# Patient Record
Sex: MALE | Race: WHITE
[De-identification: names, ages, dates, MRNs, and addresses within clinical notes are randomized per-mention and may not be internally consistent; named-entity substitution may affect disease eponyms.]

---

## 2017-07-12 ENCOUNTER — HOSPITAL ENCOUNTER (OUTPATIENT)
Dept: HOSPITAL 47 - EC | Age: 62
Setting detail: OBSERVATION
LOS: 1 days | Discharge: HOME | End: 2017-07-13
Payer: COMMERCIAL

## 2017-07-12 VITALS — BODY MASS INDEX: 29.7 KG/M2

## 2017-07-12 DIAGNOSIS — Z99.89: ICD-10-CM

## 2017-07-12 DIAGNOSIS — Z79.82: ICD-10-CM

## 2017-07-12 DIAGNOSIS — R07.89: Primary | ICD-10-CM

## 2017-07-12 DIAGNOSIS — Z79.51: ICD-10-CM

## 2017-07-12 DIAGNOSIS — Z79.899: ICD-10-CM

## 2017-07-12 DIAGNOSIS — K30: ICD-10-CM

## 2017-07-12 DIAGNOSIS — I10: ICD-10-CM

## 2017-07-12 DIAGNOSIS — G47.30: ICD-10-CM

## 2017-07-12 DIAGNOSIS — Z88.3: ICD-10-CM

## 2017-07-12 DIAGNOSIS — Z91.048: ICD-10-CM

## 2017-07-12 LAB
ALP SERPL-CCNC: 65 U/L (ref 38–126)
ALT SERPL-CCNC: 50 U/L (ref 21–72)
ANION GAP SERPL CALC-SCNC: 11 MMOL/L
AST SERPL-CCNC: 30 U/L (ref 17–59)
BASOPHILS # BLD AUTO: 0 K/UL (ref 0–0.2)
BASOPHILS NFR BLD AUTO: 1 %
BUN SERPL-SCNC: 15 MG/DL (ref 9–20)
CALCIUM SPEC-MCNC: 9.6 MG/DL (ref 8.4–10.2)
CH: 30.2
CHCM: 34.6
CHLORIDE SERPL-SCNC: 106 MMOL/L (ref 98–107)
CK SERPL-CCNC: 143 U/L (ref 55–170)
CO2 SERPL-SCNC: 25 MMOL/L (ref 22–30)
EOSINOPHIL # BLD AUTO: 0.2 K/UL (ref 0–0.7)
EOSINOPHIL NFR BLD AUTO: 2 %
ERYTHROCYTE [DISTWIDTH] IN BLOOD BY AUTOMATED COUNT: 4.65 M/UL (ref 4.3–5.9)
ERYTHROCYTE [DISTWIDTH] IN BLOOD: 13.6 % (ref 11.5–15.5)
GLUCOSE SERPL-MCNC: 124 MG/DL (ref 74–99)
HCT VFR BLD AUTO: 40.8 % (ref 39–53)
HDW: 2.62
HGB BLD-MCNC: 14.7 GM/DL (ref 13–17.5)
LUC NFR BLD AUTO: 2 %
LYMPHOCYTES # SPEC AUTO: 2 K/UL (ref 1–4.8)
LYMPHOCYTES NFR SPEC AUTO: 28 %
MAGNESIUM SPEC-SCNC: 2.1 MG/DL (ref 1.6–2.3)
MCH RBC QN AUTO: 31.6 PG (ref 25–35)
MCHC RBC AUTO-ENTMCNC: 36 G/DL (ref 31–37)
MCV RBC AUTO: 87.8 FL (ref 80–100)
MONOCYTES # BLD AUTO: 0.5 K/UL (ref 0–1)
MONOCYTES NFR BLD AUTO: 6 %
NEUTROPHILS # BLD AUTO: 4.4 K/UL (ref 1.3–7.7)
NEUTROPHILS NFR BLD AUTO: 61 %
NON-AFRICAN AMERICAN GFR(MDRD): >60
POTASSIUM SERPL-SCNC: 4.2 MMOL/L (ref 3.5–5.1)
PROT SERPL-MCNC: 6.6 G/DL (ref 6.3–8.2)
SODIUM SERPL-SCNC: 142 MMOL/L (ref 137–145)
TROPONIN I SERPL-MCNC: <0.012 NG/ML (ref 0–0.03)
WBC # BLD AUTO: 0.11 10*3/UL
WBC # BLD AUTO: 7.2 K/UL (ref 3.8–10.6)
WBC (PEROX): 6.81

## 2017-07-12 PROCEDURE — 71020: CPT

## 2017-07-12 PROCEDURE — 85025 COMPLETE CBC W/AUTO DIFF WBC: CPT

## 2017-07-12 PROCEDURE — 99285 EMERGENCY DEPT VISIT HI MDM: CPT

## 2017-07-12 PROCEDURE — 82553 CREATINE MB FRACTION: CPT

## 2017-07-12 PROCEDURE — 80061 LIPID PANEL: CPT

## 2017-07-12 PROCEDURE — 36415 COLL VENOUS BLD VENIPUNCTURE: CPT

## 2017-07-12 PROCEDURE — 85730 THROMBOPLASTIN TIME PARTIAL: CPT

## 2017-07-12 PROCEDURE — 93306 TTE W/DOPPLER COMPLETE: CPT

## 2017-07-12 PROCEDURE — 93017 CV STRESS TEST TRACING ONLY: CPT

## 2017-07-12 PROCEDURE — 85379 FIBRIN DEGRADATION QUANT: CPT

## 2017-07-12 PROCEDURE — 93350 STRESS TTE ONLY: CPT

## 2017-07-12 PROCEDURE — 80053 COMPREHEN METABOLIC PANEL: CPT

## 2017-07-12 PROCEDURE — 84484 ASSAY OF TROPONIN QUANT: CPT

## 2017-07-12 PROCEDURE — 82550 ASSAY OF CK (CPK): CPT

## 2017-07-12 PROCEDURE — 85610 PROTHROMBIN TIME: CPT

## 2017-07-12 PROCEDURE — 83735 ASSAY OF MAGNESIUM: CPT

## 2017-07-12 PROCEDURE — 93005 ELECTROCARDIOGRAM TRACING: CPT

## 2017-07-12 NOTE — ED
Chest Pain HPI





- General


Chief Complaint: Chest Pain


Stated Complaint: Chest Pain


Time Seen by Provider: 17 22:24


Source: patient


Mode of arrival: ambulatory


Limitations: no limitations





- History of Present Illness


Initial Comments: 





This patient is a 61-year-old man who presents to be evaluated for left-sided 

chest pain that developed approximately 8:30 PM tonight while he was driving 

home from [employer].  The patient states that it is an aching, he thought that it 

may be related to the muscle body hadn't exerted himself to the past few days.  

When the pain felt like it was going to his neck and also to his left arm he 

decided to be checked here.  He did take aspirin.  The patient states the pain 

is aching, constant, was moderate but is now mild.  He did not have any 

associated symptoms that came after the pain started.  He did state that he 

felt like he had an upset stomach earlier, and he tried a milkshake for that 

but didn't really change.


MD Complaint: chest pain


Onset/Timin


-: hour(s)


Onset: other (While driving)


Pain Location: left chest


Pain Radiation: LUE, neck


Severity: moderate


Quality: dull


Consistency: constant


Improves With: nothing


Worsens With: nothing


Treatments Prior to Arrival: aspirin





- Related Data


 Home Medications











 Medication  Instructions  Recorded  Confirmed


 


Aspirin 325 mg PO DAILY 16


 


Cholecalciferol [Vitamin D3] 5,000 unit PO DAILY 16


 


Loratadine [Claritin] 10 mg PO DAILY PRN 16


 


Multivitamins, Thera [Multivitamin 1 tab PO DAILY 17





(formulary)]   


 


Terbinafine [LamISIL] 250 mg PO DAILY 17


 


Triamcinolone Acetonide [Nasacort] 1 spray EA NOSTRIL HS 17











 Allergies











Allergy/AdvReac Type Severity Reaction Status Date / Time


 


adhesive tape Allergy  Itching/Swe Verified 17 22:58





   lling  


 


cephalexin [From Keflex] AdvReac  Nausea & Verified 17 22:58





   Vomiting  














Review of Systems


ROS Statement: 


Those systems with pertinent positive or pertinent negative responses have been 

documented in the HPI.





ROS Other: All systems not noted in ROS Statement are negative.


Constitutional: Denies: fever, chills


Respiratory: Denies: cough, dyspnea


Cardiovascular: Reports: as per HPI, chest pain.  Denies: palpitations, edema, 

syncope


Gastrointestinal: Reports: as per HPI, nausea.  Denies: abdominal pain, vomiting

, diarrhea


Genitourinary: Denies: dysuria, hematuria


Musculoskeletal: Denies: back pain


Skin: Denies: rash


Neurological: Denies: headache, weakness, numbness





EKG Findings





- EKG Results:


EKG: interpreted by OPAL BLANCA, sinus rhythm (Rate 73 bpm), normal axis, normal 

QRS, normal ST/T, no acute changes





- MI, Pacemaker, Normal:


Normal tracing: normal tracing





Past Medical History


Past Medical History: Hypertension, Sleep Apnea/CPAP/BIPAP


Additional Past Medical History / Comment(s): LEFT INGUINAL AND UMBILICAL HERNIA


History of Any Multi-Drug Resistant Organisms: None Reported


Past Surgical History: Orthopedic Surgery


Additional Past Surgical History / Comment(s): ANTONELLA SHOULDER SX, LEFT KNEE 

ARTHROSCOPY, UMBILICAL AND INGUINAL HERNIA


Past Anesthesia/Blood Transfusion Reactions: Previous Problems w/ Anesthesia, 

Motion Sickness, Postoperative Nausea & Vomiting (PONV)


Additional Past Anesthesia/Blood Transfusion Reaction / Comment(s): PATIENT 

STATES LONGER TO WAKE UP"


Past Psychological History: No Psychological Hx Reported


Smoking Status: Never smoker





- Past Family History


  ** Father


Family Medical History: Cancer


Additional Family Medical History / Comment(s): PROSTATE





  ** Mother


Family Medical History: Cancer


Additional Family Medical History / Comment(s): SKIN





General Exam


Limitations: no limitations


General appearance: alert, in no apparent distress


Head exam: Present: atraumatic, normocephalic


Eye exam: Present: normal appearance.  Absent: scleral icterus, conjunctival 

injection


ENT exam: Present: normal oropharynx


Neck exam: Present: normal inspection, full ROM


Respiratory exam: Present: normal lung sounds bilaterally.  Absent: respiratory 

distress, wheezes, rales, rhonchi, stridor


Cardiovascular Exam: Present: regular rate, normal rhythm, normal heart sounds.

  Absent: systolic murmur, diastolic murmur, rubs, gallop


GI/Abdominal exam: Present: soft.  Absent: distended, tenderness, guarding, 

rebound, mass


Extremities exam: Present: normal inspection, normal capillary refill.  Absent: 

pedal edema, calf tenderness


Back exam: Present: normal inspection.  Absent: CVA tenderness (R), CVA 

tenderness (L)


Neurological exam: Present: alert


Skin exam: Present: warm, dry, intact, normal color.  Absent: rash





Course


 Vital Signs











  17





  22:16 22:49


 


Temperature 97.7 F 


 


Pulse Rate 85 75


 


Respiratory 18 16





Rate  


 


Blood Pressure 129/86 131/78


 


O2 Sat by Pulse 98 98





Oximetry  














Disposition


Clinical Impression: 


 Chest pain





Disposition: ADMITTED AS IP TO THIS HOSP


Condition: Fair


Referrals: 


Judd Melgar DO [Primary Care Provider] - 1-2 days

## 2017-07-12 NOTE — XR
EXAM:

  XR Chest, 2 Views

 

CLINICAL HISTORY:

  Reason: Chest Pain

 

TECHNIQUE:

  Frontal and lateral views of the chest.

 

COMPARISON:

  None.

 

FINDINGS:

  Lungs:  Hyperinflation of both lungs is seen with probable mild 

interstitial fibrotic changes, likely representing COPD. No evidence of 

consolidation.

  Pleural space:  Unremarkable.  No pneumothorax.

  Heart:  Unremarkable.  No cardiomegaly.

  Mediastinum:  Unremarkable.

  Bones/joints:  Unremarkable.

 

IMPRESSION:     

  Hyperinflation of both lungs with probable mild interstitial fibrotic 

changes, likely representing COPD.

## 2017-07-13 VITALS — TEMPERATURE: 98.4 F | DIASTOLIC BLOOD PRESSURE: 80 MMHG | HEART RATE: 68 BPM | SYSTOLIC BLOOD PRESSURE: 116 MMHG

## 2017-07-13 VITALS — RESPIRATION RATE: 18 BRPM

## 2017-07-13 LAB
APTT BLD: 22.1 SEC (ref 22–30)
CHOLEST SERPL-MCNC: 182 MG/DL (ref ?–200)
CK SERPL-CCNC: 119 U/L (ref 55–170)
CK SERPL-CCNC: 241 U/L (ref 55–170)
HDLC SERPL-MCNC: 35 MG/DL (ref 40–60)
INR PPP: 0.9 (ref ?–1.1)
PT BLD: 9.5 SEC (ref 9–12)
TRIGL SERPL-MCNC: 160 MG/DL (ref ?–150)
TROPONIN I SERPL-MCNC: <0.012 NG/ML (ref 0–0.03)
TROPONIN I SERPL-MCNC: <0.012 NG/ML (ref 0–0.03)

## 2017-07-13 RX ADMIN — NITROGLYCERIN PRN MG: 0.4 TABLET SUBLINGUAL at 00:45

## 2017-07-13 RX ADMIN — NITROGLYCERIN PRN MG: 0.4 TABLET SUBLINGUAL at 00:55

## 2017-07-13 NOTE — CONS
This is a 61-year-old gentleman who works as a .  He came 
into the hospital with a left lateral, nondescript chest tightness.  Pain is 
more in the left anterior chest towards his shoulder and also over the deltoid 
area.  Pain seems musculoskeletal, occurs with movement but he does not recall 
doing any significant physical activity.  He apparently was driving home from 
[place] when he experienced this, continued to ache, made him concerned and 
came into the hospital.  He took an aspirin, obtained relief and then again 
took some nitroglycerin and had some relief.  Relief with nitroglycerine is 
very inconsistent. At the time of my evaluation, he is pain-free, resting 
comfortably. EKG's are normal. Two sets of troponins are normal.  



PAST MEDICAL HISTORY:

1.  Sleep apnea, he uses a CPAP.

2.  History of hypertension for which he is not on any regular medication, but 
apparently his pressure has been good lately.

3.  He is status post shoulder surgery, left knee arthroscopy, umbilical and 
inguinal hernia.



EKG revealed a sinus mechanism without significant ST-T changes. Laboratory 
data revealed that his 3 sets of troponins are normal.  LDL cholesterol is 115. 
No other significant abnormalities were noted.  D-dimer was normal.



On examination, blood pressure is 126/70, pulse rate is 70 per minute, regular.

HEENT unremarkable.  Fundus was not examined by me.  Neck is supple, no JVD.  I 
do not hear a carotid bruit.  There is no thyromegaly.  Heart exam reveals S1, 
S2 heard normally without a rub, murmur or gallop.  Lungs are clear.  Abdomen 
is soft, nontender.  Lower extremities reveal normal pulses, no edema.  Central 
nervous system is normal.  EKG reveals sinus mechanism, no acute changes.



IMPRESSION:

1.  Atypical chest pain.

2.  Question of hypertension.

3.  History of sleep apnea, uses a CPAP.



RECOMMENDATIONS:  I am recommending that we will perform a stress echo and 
based on this, I will make further recommendations.  No intervention is 
necessary unless we find abnormality on the stress test.  Discussed my thoughts 
in detail with the patient and wife. 



Thank you very much for the consult.  
OPAL

## 2017-07-14 NOTE — ECHOF
Referral Reason:



MEASUREMENTS

--------

HEIGHT: 182.9 cm

WEIGHT: 99.8 kg

BP: 185/64

RVIDd:   2.8 cm     (< 3.3)

IVSd:   1.3 cm     (0.6 - 1.1)

LVIDd:   4.2 cm     (3.9 - 5.3)

LVPWd:   1.3 cm     (0.6 - 1.1)

IVSs:   1.8 cm

LVIDs:   3.1 cm

LVPWs:   1.7 cm

LAESV Index (A-L):   15.82 ml/m

Ao Diam:   4.4 cm     (2.0 - 3.7)

AV Cusp:   1.6 cm     (1.5 - 2.6)

LA Diam:   3.1 cm     (2.7 - 3.8)

MV EXCURSION:   10.412 mm     (> 18.000)

MV EF SLOPE:   48 mm/s     (70 - 150)

EPSS:   0.8 cm

MV E Kun:   0.48 m/s

MV DecT:   402 ms

MV A Kun:   0.64 m/s

MV E/A Ratio:   0.74 

RAP:   5.00 mmHg

RVSP:   8.21 mmHg







FINDINGS

--------

Sinus rhythm.

This was a technically adequate study.

The left ventricular size is normal.   There is mild 

concentric left ventricular hypertrophy.   Overall left 

ventricular systolic function is normal with, an EF 

between 55 - 60 %.

The right ventricle is normal in size and function.

Normal LA  size by volume 22+/-6 ml/m2.

The right atrium is normal in size.

The aortic valve is trileaflet, and appears 

structurally normal. No aortic stenosis or 

regurgitation.

The mitral valve is normal.   Mild mitral regurgitation 

is present.

Mild tricuspid regurgitation present.   There is no 

evidence of pulmonary hypertension.   The right 

ventricular systolic pressure, as measured by Doppler, 

is 8.21mmHg.

The pulmonic valve was not well visualized.   There is 

no pulmonic regurgitation present.

The aortic root size is normal.

Normal inferior vena cava with normal inspiratory 

collapse consistent with estimated right atrial 

pressure of  5 mmHg.

There is no pericardial effusion.



CONCLUSIONS

--------

1. Sinus rhythm.

2. The aortic root size is normal.

3. There is no pericardial effusion.

4. This was a technically adequate study.

5. There is mild concentric left ventricular hypertrophy.

6. Overall left ventricular systolic function is normal 

with, an EF between 55 - 60 %.

7. Normal LA size by volume 22+/-6 ml/m2.

8. The aortic valve is trileaflet, and appears 

structurally normal. No aortic stenosis or 

regurgitation.

9. Mild mitral regurgitation is present.

10. There is no evidence of pulmonary hypertension.

11. There is no pulmonic regurgitation present.





SONOGRAPHER: Wing Cordero RDCS

## 2017-07-14 NOTE — EST
Referral Reason:cp



MEASUREMENTS

--------

HEIGHT: 183.0 cm

WEIGHT: 99.8 kg

BP: 124/85





FINDINGS

--------

Utilizing the standard Aaron protocol the patient was 

exercised for  7  minutes,  21  seconds, achieving a 

maximum heart rate of 152  , which is   95 % of 

predicted maximal heart rate.  There was physiologic 

heart rate and blood pressure response to exercise.

Max Heart Rate: 152  % of Max Predicted Heart Rate: 95  

Rest Heart Rate: 64  Rest BP: 124/85  Max BP: 178/63  

Mets Achieved: 8.9

The test was stopped because of fatigue.

This level of exercise represents an average exercise 

tolerance for age.

Sinus rhythm.

In response to stress, the ECG showed no ST-T wave 

changes (see exercise report for details).

In response to stress, the ECG showed no ST-T wave 

changes (see exercise report for details).

There were normal blood pressure and heart rate 

responses to stress.

LV size, wall thickness and systolic function are 

normal, with an EF of 60%.

Echo images were acquired at peak stress which 

demonstrated appropriate augmentation of all left 

ventricular segments with slight decrease in cavity 

size.



CONCLUSIONS

--------

1. The test was stopped because of fatigue.

2. This level of exercise represents an average exercise 

tolerance for age.

3. No 2D echocardiographic evidence of inducible ischemia 

to achieved workload.





SONOGRAPHER: EUGENE Otero

## 2017-07-14 NOTE — ECHOF
Referral Reason:cp



MEASUREMENTS

--------

HEIGHT: 183.0 cm

WEIGHT: 99.8 kg

BP: 124/85

WallScoring:    string







FINDINGS

--------

Utilizing the standard Aaron protocol the patient was 

exercised for  7  minutes,  21  seconds, achieving a 

maximum heart rate of 152  , which is   95 % of 

predicted maximal heart rate.  There was physiologic 

heart rate and blood pressure response to exercise.

Max Heart Rate: 152  % of Max Predicted Heart Rate: 95  

Rest Heart Rate: 64  Rest BP: 124/85  Max BP: 178/63  

Mets Achieved: 8.9

The test was stopped because of fatigue.

This level of exercise represents an average exercise 

tolerance for age.

Sinus rhythm.

In response to stress, the ECG showed no ST-T wave 

changes (see exercise report for details).

In response to stress, the ECG showed no ST-T wave 

changes (see exercise report for details).

There were normal blood pressure and heart rate 

responses to stress.

LV size, wall thickness and systolic function are 

normal, with an EF of 60%.

Echo images were acquired at peak stress which 

demonstrated appropriate augmentation of all left 

ventricular segments with slight decrease in cavity 

size.



CONCLUSIONS

--------

1. The test was stopped because of fatigue.

2. This level of exercise represents an average exercise 

tolerance for age.

3. No 2D echocardiographic evidence of inducible ischemia 

to achieved workload.





SONOGRAPHER: Wing Cordero RDCS

## 2018-10-09 ENCOUNTER — HOSPITAL ENCOUNTER (EMERGENCY)
Dept: HOSPITAL 47 - EC | Age: 63
Discharge: HOME | End: 2018-10-09
Payer: COMMERCIAL

## 2018-10-09 VITALS
DIASTOLIC BLOOD PRESSURE: 97 MMHG | TEMPERATURE: 97.9 F | RESPIRATION RATE: 16 BRPM | SYSTOLIC BLOOD PRESSURE: 146 MMHG | HEART RATE: 78 BPM

## 2018-10-09 DIAGNOSIS — Z88.1: ICD-10-CM

## 2018-10-09 DIAGNOSIS — G47.30: ICD-10-CM

## 2018-10-09 DIAGNOSIS — I10: Primary | ICD-10-CM

## 2018-10-09 DIAGNOSIS — Z98.890: ICD-10-CM

## 2018-10-09 DIAGNOSIS — Z99.89: ICD-10-CM

## 2018-10-09 DIAGNOSIS — Z79.51: ICD-10-CM

## 2018-10-09 DIAGNOSIS — M19.90: ICD-10-CM

## 2018-10-09 DIAGNOSIS — Z90.49: ICD-10-CM

## 2018-10-09 DIAGNOSIS — Z91.048: ICD-10-CM

## 2018-10-09 PROCEDURE — 99283 EMERGENCY DEPT VISIT LOW MDM: CPT

## 2018-10-09 NOTE — ED
ENT HPI





- General


Chief complaint: ENT


Stated complaint: Nose bleed


Time Seen by Provider: 10/09/18 15:19


Source: patient, RN notes reviewed


Mode of arrival: ambulatory


Limitations: no limitations





- History of Present Illness


Initial comments: 





62-year-old male presented Department with chief complaint of epistaxis.  

Patient states it started after he pulled her out of his nose.  Patient states 

then he seemed to have a little bit of bleeding which was persistent and 

increased to clotting.  Patient states he has no headache, dizziness, shortness 

breath, chest pain.  Patient states he does not take any blood thinners.  

Patient states he has had issues with no bleeds when he was an child.  Patient 

states that he used to be on blood pressure medication but recently stopped and 

wonders if this had anything to do with it.





- Related Data


 Home Medications











 Medication  Instructions  Recorded  Confirmed


 


Cholecalciferol [Vitamin D3] 5,000 unit PO DAILY 05/13/16 10/09/18


 


Multivitamins, Thera [Multivitamin 1 tab PO DAILY 07/12/17 10/09/18





(formulary)]   


 


Fluticasone Nasal Spray [Flonase 1 spray EA NOSTRIL HS 10/09/18 10/09/18





Nasal [place]]   


 


Naproxen [Naprosyn] 250 mg PO DAILY PRN 10/09/18 10/09/18








 Previous Rx's











 Medication  Instructions  Recorded


 


Nebivolol [Bystolic] 5 mg PO DAILY #30 tablet 10/09/18











 Allergies











Allergy/AdvReac Type Severity Reaction Status Date / Time


 


adhesive tape Allergy  Itching/Swe Verified 10/09/18 15:28





   lling  


 


cephalexin [From Keflex] AdvReac  Nausea & Verified 10/09/18 15:28





   Vomiting  














Review of Systems


ROS Statement: 


Those systems with pertinent positive or pertinent negative responses have been 

documented in the HPI.





ROS Other: All systems not noted in ROS Statement are negative.





Past Medical History


Past Medical History: Hypertension, Sleep Apnea/CPAP/BIPAP


Additional Past Medical History / Comment(s): LEFT INGUINAL AND UMBILICAL HERNIA

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


History of Any Multi-Drug Resistant Organisms: None Reported


Past Surgical History: Cholecystectomy, Orthopedic Surgery


Additional Past Surgical History / Comment(s): ANTONELLA SHOULDER SX, antonella KNEE 

ARTHROSCOPY, UMBILICAL AND INGUINAL HERNIA


Past Anesthesia/Blood Transfusion Reactions: Previous Problems w/ Anesthesia, 

Motion Sickness, Postoperative Nausea & Vomiting (PONV)


Additional Past Anesthesia/Blood Transfusion Reaction / Comment(s): PATIENT 

STATES LONGER TO WAKE UP"


Past Psychological History: No Psychological Hx Reported


Smoking Status: Never smoker


Past Alcohol Use History: Rare


Past Drug Use History: None Reported





- Past Family History


  ** Father


Family Medical History: Cancer


Additional Family Medical History / Comment(s): PROSTATE





  ** Mother


Family Medical History: Cancer


Additional Family Medical History / Comment(s): SKIN





General Exam


Limitations: no limitations


General appearance: alert, in no apparent distress


Head exam: Present: atraumatic, normocephalic, normal inspection


Eye exam: Present: normal appearance, PERRL, EOMI.  Absent: scleral icterus, 

conjunctival injection, periorbital swelling


ENT exam: Present: normal oropharynx, mucous membranes moist.  Absent: normal 

exam (Blood noted in the left nostril)


Neck exam: Present: normal inspection, full ROM.  Absent: tenderness, 

meningismus, lymphadenopathy


Respiratory exam: Present: normal lung sounds bilaterally.  Absent: respiratory 

distress, wheezes, rales, rhonchi, stridor


Cardiovascular Exam: Present: regular rate, normal rhythm, normal heart sounds.

  Absent: systolic murmur, diastolic murmur, rubs, gallop, clicks


Neurological exam: Present: alert, oriented X3, CN II-XII intact





Course





 Vital Signs











  10/09/18 10/09/18 10/09/18





  15:07 15:25 15:48


 


Temperature 98.7 F  


 


Pulse Rate 95 88 76


 


Respiratory 18 20 20





Rate   


 


Blood Pressure 156/112 156/104 139/92


 


O2 Sat by Pulse 98  99





Oximetry   














Medical Decision Making





- Medical Decision Making





62-year-old male presented for epistaxis left nostril.  Patient was given Afrin 

emergency department there is been no rebleeding.  Patient's blood pressure has 

been slightly labile.  He has recently stopped his diastolic.  Patient will 

restart 5 mg and follow-up with his PCP.  We did discuss treatment for 

rebleeding and return parameters.





Disposition


Clinical Impression: 


 Epistaxis, Hypertension





Disposition: HOME SELF-CARE


Condition: Stable


Instructions:  Nosebleed (ED)


Additional Instructions: 


Please return to the Emergency Department if symptoms worsen or any other 

concerns.


Prescriptions: 


Nebivolol [Bystolic] 5 mg PO DAILY #30 tablet


Is patient prescribed a controlled substance at d/c from ED?: No


Referrals: 


Judd Melgar DO [Primary Care Provider] - 1-2 days


Time of Disposition: 16:39

## 2019-02-11 ENCOUNTER — HOSPITAL ENCOUNTER (OUTPATIENT)
Dept: HOSPITAL 47 - RADCTMAIN | Age: 64
Discharge: HOME | End: 2019-02-11
Attending: SURGERY
Payer: COMMERCIAL

## 2019-02-11 DIAGNOSIS — K40.90: Primary | ICD-10-CM

## 2019-02-11 PROCEDURE — 72193 CT PELVIS W/DYE: CPT

## 2019-02-11 NOTE — CT
EXAMINATION TYPE: CT pelvis w con

 

DATE OF EXAM: 2/11/2019

 

COMPARISON: None.

 

HISTORY: left groin pain and burning. hx of multiple hernia repairs.

 

CT DLP: 1143.6 mGycm

Automated exposure control for dose reduction was used.

 

CONTRAST: 

Performed with oral and with IV Contrast, patient injected with 100 mL of Isovue 300.

 

FINDINGS: 

Oral contrast does not reach level of the terminal ileum making evaluation of distal bowel slightly s
uboptimal. There is no suspicious small or large bowel dilatation. Few diverticula proximal sigmoid c
olon are present without CT evidence for acute diverticulitis.

 

Prostate gland is mildly enlarged in size bulging on bladder base, underlying BPH may be present. Cor
relate clinically. The bladder is felt within normal limits.

 

There is no concerning pelvic fluid collection. There is no suspicious pelvic adenopathy.

 

There is small to moderate-sized fat-containing right inguinal hernia. No suspicious left groin herni
a is present. No groin adenopathy is identified. Muscle bulk bilateral thighs is symmetric and felt w
ithin normal limits.

 

There is mild to moderate bilateral axial joint space loss in both hips with mild acetabular spurring
. Sclerotic focus left iliac bone. Sacroiliac joint and cystic change favors benign bone island. Sacr
oiliac joints are preserved. Small posterior disc herniations efface the anterior thecal sac at L4-L5
 and L5-S1 levels with mild facet arthropathy lower lumbar levels seen.

 

IMPRESSION: 

CONFIRMATION OF SMALL TO BORDERLINE MODERATE SIZED FAT CONTAINING RIGHT INGUINAL HERNIA.

## 2019-02-27 ENCOUNTER — HOSPITAL ENCOUNTER (OUTPATIENT)
Dept: HOSPITAL 47 - LABWHC1 | Age: 64
Discharge: HOME | End: 2019-02-27
Attending: SURGERY
Payer: COMMERCIAL

## 2019-02-27 DIAGNOSIS — R10.30: ICD-10-CM

## 2019-02-27 DIAGNOSIS — Z01.812: Primary | ICD-10-CM

## 2019-02-27 PROCEDURE — 84520 ASSAY OF UREA NITROGEN: CPT

## 2019-02-27 PROCEDURE — 82565 ASSAY OF CREATININE: CPT

## 2019-02-27 PROCEDURE — 36415 COLL VENOUS BLD VENIPUNCTURE: CPT

## 2019-02-28 LAB — BUN SERPL-SCNC: 18 MG/DL (ref 9–27)

## 2019-09-13 ENCOUNTER — HOSPITAL ENCOUNTER (EMERGENCY)
Dept: HOSPITAL 47 - EC | Age: 64
Discharge: HOME | End: 2019-09-13
Payer: COMMERCIAL

## 2019-09-13 VITALS
RESPIRATION RATE: 18 BRPM | DIASTOLIC BLOOD PRESSURE: 87 MMHG | SYSTOLIC BLOOD PRESSURE: 129 MMHG | HEART RATE: 73 BPM | TEMPERATURE: 98.3 F

## 2019-09-13 DIAGNOSIS — R20.3: ICD-10-CM

## 2019-09-13 DIAGNOSIS — R42: Primary | ICD-10-CM

## 2019-09-13 DIAGNOSIS — Z79.899: ICD-10-CM

## 2019-09-13 DIAGNOSIS — Z91.048: ICD-10-CM

## 2019-09-13 DIAGNOSIS — Z99.89: ICD-10-CM

## 2019-09-13 DIAGNOSIS — I10: ICD-10-CM

## 2019-09-13 DIAGNOSIS — Z86.69: ICD-10-CM

## 2019-09-13 DIAGNOSIS — G47.30: ICD-10-CM

## 2019-09-13 DIAGNOSIS — R51: ICD-10-CM

## 2019-09-13 DIAGNOSIS — R11.10: ICD-10-CM

## 2019-09-13 DIAGNOSIS — Z88.1: ICD-10-CM

## 2019-09-13 LAB
ANION GAP SERPL CALC-SCNC: 11 MMOL/L
BASOPHILS # BLD AUTO: 0 K/UL (ref 0–0.2)
BASOPHILS NFR BLD AUTO: 1 %
BUN SERPL-SCNC: 14 MG/DL (ref 9–20)
CALCIUM SPEC-MCNC: 9.9 MG/DL (ref 8.4–10.2)
CHLORIDE SERPL-SCNC: 104 MMOL/L (ref 98–107)
CO2 SERPL-SCNC: 26 MMOL/L (ref 22–30)
EOSINOPHIL # BLD AUTO: 0.1 K/UL (ref 0–0.7)
EOSINOPHIL NFR BLD AUTO: 1 %
ERYTHROCYTE [DISTWIDTH] IN BLOOD BY AUTOMATED COUNT: 4.75 M/UL (ref 4.3–5.9)
ERYTHROCYTE [DISTWIDTH] IN BLOOD: 13.5 % (ref 11.5–15.5)
GLUCOSE SERPL-MCNC: 139 MG/DL (ref 74–99)
HCT VFR BLD AUTO: 42.9 % (ref 39–53)
HGB BLD-MCNC: 14.4 GM/DL (ref 13–17.5)
LYMPHOCYTES # SPEC AUTO: 1.4 K/UL (ref 1–4.8)
LYMPHOCYTES NFR SPEC AUTO: 20 %
MAGNESIUM SPEC-SCNC: 2 MG/DL (ref 1.6–2.3)
MCH RBC QN AUTO: 30.3 PG (ref 25–35)
MCHC RBC AUTO-ENTMCNC: 33.6 G/DL (ref 31–37)
MCV RBC AUTO: 90.3 FL (ref 80–100)
MONOCYTES # BLD AUTO: 0.4 K/UL (ref 0–1)
MONOCYTES NFR BLD AUTO: 6 %
NEUTROPHILS # BLD AUTO: 5 K/UL (ref 1.3–7.7)
NEUTROPHILS NFR BLD AUTO: 72 %
PLATELET # BLD AUTO: 219 K/UL (ref 150–450)
POTASSIUM SERPL-SCNC: 4.2 MMOL/L (ref 3.5–5.1)
SODIUM SERPL-SCNC: 141 MMOL/L (ref 137–145)
WBC # BLD AUTO: 6.9 K/UL (ref 3.8–10.6)

## 2019-09-13 PROCEDURE — 36415 COLL VENOUS BLD VENIPUNCTURE: CPT

## 2019-09-13 PROCEDURE — 85025 COMPLETE CBC W/AUTO DIFF WBC: CPT

## 2019-09-13 PROCEDURE — 93005 ELECTROCARDIOGRAM TRACING: CPT

## 2019-09-13 PROCEDURE — 96374 THER/PROPH/DIAG INJ IV PUSH: CPT

## 2019-09-13 PROCEDURE — 96361 HYDRATE IV INFUSION ADD-ON: CPT

## 2019-09-13 PROCEDURE — 99284 EMERGENCY DEPT VISIT MOD MDM: CPT

## 2019-09-13 PROCEDURE — 83735 ASSAY OF MAGNESIUM: CPT

## 2019-09-13 PROCEDURE — 80048 BASIC METABOLIC PNL TOTAL CA: CPT

## 2019-09-13 NOTE — ED
General Adult HPI





- General


Chief complaint: Dizziness


Stated complaint: Dizziness


Time Seen by Provider: 09/13/19 07:20


Source: patient


Limitations: no limitations





- History of Present Illness


Initial comments: 





Dictation was produced using dragon dictation software. please excuse any 

grammatical, word or spelling errors. 





Chief Complaint: 63-year-old male with past medical history of hypertension, 

sleep apnea and the PV presents with dizziness and headache since yesterday.





History of Present Illness: he is 63-year-old male who has past medical history 

of BPPV.  Patient states that since last night he's been having significant 

vertiginous symptoms.  Patient has a history of BPPV.  He was taught previously 

how to perform Epley maneuvers.  Try that yesterday with small improvement of 

symptoms however not complete resolution of symptoms.  Morning his symptoms 

return.  Patient also has a left temporal headache.  Denies any jaw 

claudication, no vision loss.  Does feel that there is some slight 

hyperesthesias to the left maxillary area.  Patient reports that his symptoms 

are worse with movement.  He's had multiple episodes of vomiting secondary to 

this.  Denies any hearing loss or tinnitus.








The ROS documented in this emergency department record has been reviewed and 

confirmed by me.  Those systems with pertinent positive or negative responses 

have been documented in the HPI.  All other systems are other negative and/or 

noncontributory.








PHYSICAL EXAM:


General Impression: Alert and oriented x3, not in acute distress


HEENT: Normocephalic atraumatic, extra-ocular movements intact, pupils equal and

reactive to light bilaterally, mucous membranes moist.


Cardiovascular: Heart regular rate and rhythm, S1&S2 audible, no murmurs, rubs 

or gallops


Chest: Lungs clear to auscultation bilaterally, no rhonchi, no wheeze, no rales


Abdomen: Bowel sounds present, abdomen soft, non-tender, non-distended, no 

organomegaly


Musculoskeletal: Pulses present and equal in all extremities, no peripheral 

edema


Motor:  no focal deficits noted


Neurological: CN II-XII grossly intact, no focal motor or sensory deficits 

noted, left beating nystagmus


Skin: Intact with no visualized rashes


Psych: Normal affect and mood





ED course: 63-year-old male presents with vertigo since yesterday.  He also has 

complaint of headache since yesterday.  Signs upon arrival are within acceptable

limits.  Patient reports that his headache is not the worst pain of his life and

is not thunderclap.  No clinical suspicion of subarachnoid hemorrhage at this 

time.  HINTS exam was performed.  HINTS exam is not suggestive of central 

vertigo.


Laboratory evaluation obtained.  CBC and metabolic panel is unremarkable.  

Patient was treated with Antivert, Zofran and intravenous fluids.  Patient's 

symptoms are much improved.  Patient handles her baseline.  Patient given 

prescription for Antivert for peripheral vertigo.  He is given referral to 

outpatient ENT physician for outpatient management of peripheral vertigo.  

Patient is understandable and agreeable to disposition.  Return parameters 

discussed..





EKG interpretation: Ventricular rate 60, normal sinus rhythm, IN interval 142, 

care is 102, QTc 44. No IN prolongation, no QTC prolongation, no ST or T-wave 

changes noted.  EKG compared to 07/13/2017 showing no changes.  Overall, this 

EKG is unremarkable











- Related Data


                                Home Medications











 Medication  Instructions  Recorded  Confirmed


 


Cholecalciferol [Vitamin D3 (25 5,000 unit PO DAILY 05/13/16 09/13/19





Mcg = 1000 Iu)]   


 


Multivitamins, Thera [Multivitamin 1 tab PO DAILY 07/12/17 09/13/19





(formulary)]   


 


Beet Root 1 tab PO DAILY 09/13/19 09/13/19


 


Glucosamine-Chondr 500-400Mg 1 tab PO DAILY 09/13/19 09/13/19


 


Lutein 10 mg PO DAILY 09/13/19 09/13/19


 


Nebivolol [Bystolic] 5 mg PO DAILY PRN 09/13/19 09/13/19








                                  Previous Rx's











 Medication  Instructions  Recorded


 


Meclizine [Antivert] 25 mg PO TID PRN #15 tab 09/13/19











                                    Allergies











Allergy/AdvReac Type Severity Reaction Status Date / Time


 


adhesive tape Allergy  Itching/Swe Verified 09/13/19 08:09





   lling  


 


cephalexin [From Keflex] AdvReac  Nausea & Verified 09/13/19 08:09





   Vomiting  














Review of Systems


ROS Statement: 


Those systems with pertinent positive or pertinent negative responses have been 

documented in the HPI.





ROS Other: All systems not noted in ROS Statement are negative.





Past Medical History


Past Medical History: Hypertension, Sleep Apnea/CPAP/BIPAP


Additional Past Medical History / Comment(s): LEFT INGUINAL AND UMBILICAL 

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


History of Any Multi-Drug Resistant Organisms: None Reported


Past Surgical History: Cholecystectomy, Orthopedic Surgery


Additional Past Surgical History / Comment(s): ANTONELLA SHOULDER SX, antonella KNEE 

ARTHROSCOPY, UMBILICAL AND INGUINAL HERNIA


Past Anesthesia/Blood Transfusion Reactions: Previous Problems w/ Anesthesia, 

Motion Sickness, Postoperative Nausea & Vomiting (PONV)


Additional Past Anesthesia/Blood Transfusion Reaction / Comment(s): PATIENT 

STATES LONGER TO WAKE UP"


Past Psychological History: No Psychological Hx Reported


Smoking Status: Never smoker


Past Alcohol Use History: Rare


Past Drug Use History: None Reported





- Past Family History


  ** Father


Family Medical History: Cancer


Additional Family Medical History / Comment(s): PROSTATE





  ** Mother


Family Medical History: Cancer


Additional Family Medical History / Comment(s): SKIN





General Exam


Limitations: no limitations





Course


                                   Vital Signs











  09/13/19





  07:14


 


Temperature 97.6 F


 


Pulse Rate 85


 


Respiratory 20





Rate 


 


Blood Pressure 139/89


 


O2 Sat by Pulse 100





Oximetry 














Medical Decision Making





- Lab Data


Result diagrams: 


                                 09/13/19 07:45





                                 09/13/19 07:45


                                   Lab Results











  09/13/19 09/13/19 Range/Units





  07:45 07:45 


 


WBC   6.9  (3.8-10.6)  k/uL


 


RBC   4.75  (4.30-5.90)  m/uL


 


Hgb   14.4  (13.0-17.5)  gm/dL


 


Hct   42.9  (39.0-53.0)  %


 


MCV   90.3  (80.0-100.0)  fL


 


MCH   30.3  (25.0-35.0)  pg


 


MCHC   33.6  (31.0-37.0)  g/dL


 


RDW   13.5  (11.5-15.5)  %


 


Plt Count   219  (150-450)  k/uL


 


Neutrophils %   72  %


 


Lymphocytes %   20  %


 


Monocytes %   6  %


 


Eosinophils %   1  %


 


Basophils %   1  %


 


Neutrophils #   5.0  (1.3-7.7)  k/uL


 


Lymphocytes #   1.4  (1.0-4.8)  k/uL


 


Monocytes #   0.4  (0-1.0)  k/uL


 


Eosinophils #   0.1  (0-0.7)  k/uL


 


Basophils #   0.0  (0-0.2)  k/uL


 


Sodium  141   (137-145)  mmol/L


 


Potassium  4.2   (3.5-5.1)  mmol/L


 


Chloride  104   ()  mmol/L


 


Carbon Dioxide  26   (22-30)  mmol/L


 


Anion Gap  11   mmol/L


 


BUN  14   (9-20)  mg/dL


 


Creatinine  0.96   (0.66-1.25)  mg/dL


 


Est GFR (CKD-EPI)AfAm  >90   (>60 ml/min/1.73 sqM)  


 


Est GFR (CKD-EPI)NonAf  85   (>60 ml/min/1.73 sqM)  


 


Glucose  139 H   (74-99)  mg/dL


 


Calcium  9.9   (8.4-10.2)  mg/dL


 


Magnesium  2.0   (1.6-2.3)  mg/dL














Disposition


Clinical Impression: 


 Vertigo





Disposition: HOME SELF-CARE


Condition: Good


Instructions (If sedation given, give patient instructions):  Dizziness (ED)


Prescriptions: 


Meclizine [Antivert] 25 mg PO TID PRN #15 tab


 PRN Reason: dizziness


Is patient prescribed a controlled substance at d/c from ED?: No


Referrals: 


Edgar Gonsalez DO [Doctor of Osteopathic Medicine] - 1-2 days


Time of Disposition: 09:24